# Patient Record
Sex: MALE | Race: WHITE | URBAN - METROPOLITAN AREA
[De-identification: names, ages, dates, MRNs, and addresses within clinical notes are randomized per-mention and may not be internally consistent; named-entity substitution may affect disease eponyms.]

---

## 2017-08-09 ENCOUNTER — HISTORICAL (OUTPATIENT)
Dept: LAB | Facility: HOSPITAL | Age: 82
End: 2017-08-09

## 2017-08-09 LAB
ABS NEUT (OLG): 2.36 X10(3)/MCL (ref 2.1–9.2)
ANISOCYTOSIS BLD QL SMEAR: 1
BASOPHILS NFR BLD MANUAL: 1 % (ref 0–2)
BUN SERPL-MCNC: 14 MG/DL (ref 7–18)
CALCIUM SERPL-MCNC: 8.3 MG/DL (ref 8.5–10.1)
CHLORIDE SERPL-SCNC: 106 MMOL/L (ref 98–107)
CO2 SERPL-SCNC: 29 MMOL/L (ref 21–32)
CREAT SERPL-MCNC: 0.76 MG/DL (ref 0.7–1.3)
EOSINOPHIL NFR BLD MANUAL: 2 % (ref 0–8)
ERYTHROCYTE [DISTWIDTH] IN BLOOD BY AUTOMATED COUNT: 13 % (ref 11.5–17)
GLUCOSE SERPL-MCNC: 201 MG/DL (ref 74–106)
HCT VFR BLD AUTO: 39 % (ref 42–52)
HGB BLD-MCNC: 13.1 GM/DL (ref 14–18)
LYMPHOCYTES NFR BLD MANUAL: 13 %
LYMPHOCYTES NFR BLD MANUAL: 6 % (ref 13–40)
MCH RBC QN AUTO: 30.2 PG (ref 27–31)
MCHC RBC AUTO-ENTMCNC: 33.6 GM/DL (ref 33–36)
MCV RBC AUTO: 89.9 FL (ref 80–94)
MONOCYTES NFR BLD MANUAL: 10 % (ref 2–11)
NEUTROPHILS NFR BLD MANUAL: 68 % (ref 47–80)
PLATELET # BLD AUTO: 133 X10(3)/MCL (ref 130–400)
PLATELET # BLD EST: NORMAL 10*3/UL
PMV BLD AUTO: 8.3 FL (ref 9.4–12.4)
POTASSIUM SERPL-SCNC: 4.5 MMOL/L (ref 3.5–5.1)
RBC # BLD AUTO: 4.34 X10(6)/MCL (ref 4.7–6.1)
SODIUM SERPL-SCNC: 144 MMOL/L (ref 136–145)
WBC # SPEC AUTO: 4 X10(3)/MCL (ref 4.5–11.5)

## 2020-12-08 LAB
ABS NEUT (OLG): 3.91
BASOPHILS # BLD AUTO: 0.01 X10(3)/MCL
BASOPHILS NFR BLD AUTO: 0.1 %
EOSINOPHIL # BLD AUTO: 0.18 X10(3)/MCL
EOSINOPHIL NFR BLD AUTO: 2.6 %
ERYTHROCYTE [DISTWIDTH] IN BLOOD BY AUTOMATED COUNT: 14 %
HCT VFR BLD AUTO: 27.3 % (ref 39–49)
HGB BLD-MCNC: 8.9 GM/DL (ref 12.6–16.6)
IMM GRANULOCYTES # BLD AUTO: 0.02 10*3/UL (ref 0–0.1)
IMM GRANULOCYTES NFR BLD AUTO: 0.3 % (ref 0–1)
LYMPHOCYTES # BLD AUTO: 1.71 X10(3)/MCL
LYMPHOCYTES NFR BLD AUTO: 25.1 %
MCH RBC QN AUTO: 30.2 PG (ref 27–33)
MCHC RBC AUTO-ENTMCNC: 32.6 GM/DL (ref 32–35)
MCV RBC AUTO: 92.5 FL (ref 84–97)
MONOCYTES # BLD AUTO: 0.97 X10(3)/MCL
MONOCYTES NFR BLD AUTO: 14.3 %
NEUTROPHILS # BLD AUTO: 3.91 X10(3)/MCL
NEUTROPHILS NFR BLD AUTO: 57.6 %
PLATELET # BLD AUTO: 214 X10(3)/MCL (ref 140–450)
PMV BLD AUTO: 8 FL
RBC # BLD AUTO: 2.95 X10(6)/MCL (ref 4.3–5.6)
WBC # SPEC AUTO: 6.8 X10(3)/MCL (ref 3.4–9.2)

## 2020-12-09 LAB
ABS NEUT (OLG): 4.03
ALBUMIN SERPL-MCNC: 2.4 GM/DL (ref 3.4–4.8)
ALBUMIN/GLOB SERPL: 0.8 RATIO (ref 1.1–2)
ALP SERPL-CCNC: 57 UNIT/L (ref 40–150)
ALT SERPL-CCNC: 15 UNIT/L (ref 0–55)
AST SERPL-CCNC: 21 UNIT/L (ref 5–34)
BASOPHILS # BLD AUTO: 0.01 X10(3)/MCL
BASOPHILS NFR BLD AUTO: 0.1 %
BILIRUB SERPL-MCNC: 1 MG/DL
BILIRUBIN DIRECT+TOT PNL SERPL-MCNC: 0.5 MG/DL
BILIRUBIN DIRECT+TOT PNL SERPL-MCNC: 0.5 MG/DL (ref 0–0.5)
BUN SERPL-MCNC: 17 MG/DL (ref 8.4–25.7)
CALCIUM SERPL-MCNC: 7.9 MG/DL (ref 8.8–10)
CHLORIDE SERPL-SCNC: 102 MMOL/L (ref 98–107)
CO2 SERPL-SCNC: 29 MEQ/L (ref 23–31)
CREAT SERPL-MCNC: 0.65 MG/DL (ref 0.73–1.18)
EOSINOPHIL # BLD AUTO: 0.19 X10(3)/MCL
EOSINOPHIL NFR BLD AUTO: 2.8 %
ERYTHROCYTE [DISTWIDTH] IN BLOOD BY AUTOMATED COUNT: 14 %
EST. AVERAGE GLUCOSE BLD GHB EST-MCNC: 166 MG/DL
GLOBULIN SER-MCNC: 2.9 GM/DL (ref 2.4–3.5)
GLUCOSE SERPL-MCNC: 136 MG/DL (ref 82–115)
HBA1C MFR BLD: 7.4 % (ref 4–6)
HCT VFR BLD AUTO: 27.4 % (ref 39–49)
HGB BLD-MCNC: 8.8 GM/DL (ref 12.6–16.6)
IMM GRANULOCYTES # BLD AUTO: 0.02 10*3/UL (ref 0–0.1)
IMM GRANULOCYTES NFR BLD AUTO: 0.3 % (ref 0–1)
LYMPHOCYTES # BLD AUTO: 1.68 X10(3)/MCL
LYMPHOCYTES NFR BLD AUTO: 24.6 %
MAGNESIUM SERPL-MCNC: 2.08 MG/DL (ref 1.6–2.6)
MCH RBC QN AUTO: 29.8 PG (ref 27–33)
MCHC RBC AUTO-ENTMCNC: 32.1 GM/DL (ref 32–35)
MCV RBC AUTO: 92.9 FL (ref 84–97)
MONOCYTES # BLD AUTO: 0.89 X10(3)/MCL
MONOCYTES NFR BLD AUTO: 13 %
NEUTROPHILS # BLD AUTO: 4.03 X10(3)/MCL
NEUTROPHILS NFR BLD AUTO: 59.2 %
PLATELET # BLD AUTO: 219 X10(3)/MCL (ref 140–450)
PMV BLD AUTO: 8 FL
POTASSIUM SERPL-SCNC: 4.4 MMOL/L (ref 3.5–5.1)
PROT SERPL-MCNC: 5.3 GM/DL (ref 5.8–7.6)
RBC # BLD AUTO: 2.95 X10(6)/MCL (ref 4.3–5.6)
SODIUM SERPL-SCNC: 137 MMOL/L (ref 136–145)
WBC # SPEC AUTO: 6.82 X10(3)/MCL (ref 3.4–9.2)

## 2020-12-14 LAB
ABS NEUT (OLG): 4.73
ALBUMIN SERPL-MCNC: 2.6 GM/DL (ref 3.4–4.8)
ALBUMIN/GLOB SERPL: 0.9 RATIO (ref 1.1–2)
ALP SERPL-CCNC: 78 UNIT/L (ref 40–150)
ALT SERPL-CCNC: 12 UNIT/L (ref 0–55)
AST SERPL-CCNC: 15 UNIT/L (ref 5–34)
BASOPHILS # BLD AUTO: 0.02 X10(3)/MCL
BASOPHILS NFR BLD AUTO: 0.3 %
BILIRUB SERPL-MCNC: 1 MG/DL
BILIRUBIN DIRECT+TOT PNL SERPL-MCNC: 0.5 MG/DL
BILIRUBIN DIRECT+TOT PNL SERPL-MCNC: 0.5 MG/DL (ref 0–0.5)
BUN SERPL-MCNC: 20 MG/DL (ref 8.4–25.7)
CALCIUM SERPL-MCNC: 8.1 MG/DL (ref 8.8–10)
CHLORIDE SERPL-SCNC: 99 MMOL/L (ref 98–107)
CO2 SERPL-SCNC: 27 MEQ/L (ref 23–31)
CREAT SERPL-MCNC: 0.79 MG/DL (ref 0.73–1.18)
EOSINOPHIL # BLD AUTO: 0.09 X10(3)/MCL
EOSINOPHIL NFR BLD AUTO: 1.3 %
ERYTHROCYTE [DISTWIDTH] IN BLOOD BY AUTOMATED COUNT: 14 %
GLOBULIN SER-MCNC: 2.9 GM/DL (ref 2.4–3.5)
GLUCOSE SERPL-MCNC: 115 MG/DL (ref 82–115)
HCT VFR BLD AUTO: 29 % (ref 39–49)
HGB BLD-MCNC: 9.2 GM/DL (ref 12.6–16.6)
IMM GRANULOCYTES # BLD AUTO: 0.03 10*3/UL (ref 0–0.1)
IMM GRANULOCYTES NFR BLD AUTO: 0.4 % (ref 0–1)
LYMPHOCYTES # BLD AUTO: 1.34 X10(3)/MCL
LYMPHOCYTES NFR BLD AUTO: 19.2 %
MCH RBC QN AUTO: 29.8 PG (ref 27–33)
MCHC RBC AUTO-ENTMCNC: 31.7 GM/DL (ref 32–35)
MCV RBC AUTO: 93.9 FL (ref 84–97)
MONOCYTES # BLD AUTO: 0.77 X10(3)/MCL
MONOCYTES NFR BLD AUTO: 11 %
NEUTROPHILS # BLD AUTO: 4.73 X10(3)/MCL
NEUTROPHILS NFR BLD AUTO: 67.8 %
PLATELET # BLD AUTO: 324 X10(3)/MCL (ref 140–450)
PMV BLD AUTO: 8 FL
POTASSIUM SERPL-SCNC: 4.7 MMOL/L (ref 3.5–5.1)
PROT SERPL-MCNC: 5.5 GM/DL (ref 5.8–7.6)
RBC # BLD AUTO: 3.09 X10(6)/MCL (ref 4.3–5.6)
SODIUM SERPL-SCNC: 134 MMOL/L (ref 136–145)
WBC # SPEC AUTO: 6.98 X10(3)/MCL (ref 3.4–9.2)

## 2020-12-19 LAB
ABS NEUT (OLG): 3.26
ALBUMIN SERPL-MCNC: 2.9 GM/DL (ref 3.4–4.8)
ALBUMIN/GLOB SERPL: 1 RATIO (ref 1.1–2)
ALP SERPL-CCNC: 141 UNIT/L (ref 40–150)
ALT SERPL-CCNC: 10 UNIT/L (ref 0–55)
AST SERPL-CCNC: 12 UNIT/L (ref 5–34)
BASOPHILS # BLD AUTO: 0.01 X10(3)/MCL
BASOPHILS NFR BLD AUTO: 0.2 %
BILIRUB SERPL-MCNC: 0.8 MG/DL
BILIRUBIN DIRECT+TOT PNL SERPL-MCNC: 0.4 MG/DL
BILIRUBIN DIRECT+TOT PNL SERPL-MCNC: 0.4 MG/DL (ref 0–0.5)
BUN SERPL-MCNC: 21 MG/DL (ref 8.4–25.7)
CALCIUM SERPL-MCNC: 8.4 MG/DL (ref 8.8–10)
CHLORIDE SERPL-SCNC: 103 MMOL/L (ref 98–107)
CO2 SERPL-SCNC: 29 MEQ/L (ref 23–31)
CREAT SERPL-MCNC: 0.71 MG/DL (ref 0.73–1.18)
EOSINOPHIL # BLD AUTO: 0.07 X10(3)/MCL
EOSINOPHIL NFR BLD AUTO: 1.2 %
ERYTHROCYTE [DISTWIDTH] IN BLOOD BY AUTOMATED COUNT: 14 %
GLOBULIN SER-MCNC: 3 GM/DL (ref 2.4–3.5)
GLUCOSE SERPL-MCNC: 135 MG/DL (ref 82–115)
HCT VFR BLD AUTO: 30.7 % (ref 39–49)
HGB BLD-MCNC: 9.6 GM/DL (ref 12.6–16.6)
IMM GRANULOCYTES # BLD AUTO: 0.01 10*3/UL (ref 0–0.1)
IMM GRANULOCYTES NFR BLD AUTO: 0.2 % (ref 0–1)
LYMPHOCYTES # BLD AUTO: 1.66 X10(3)/MCL
LYMPHOCYTES NFR BLD AUTO: 29.3 %
MAGNESIUM SERPL-MCNC: 2.1 MG/DL (ref 1.6–2.6)
MCH RBC QN AUTO: 29.7 PG (ref 27–33)
MCHC RBC AUTO-ENTMCNC: 31.3 GM/DL (ref 32–35)
MCV RBC AUTO: 95 FL (ref 84–97)
MONOCYTES # BLD AUTO: 0.66 X10(3)/MCL
MONOCYTES NFR BLD AUTO: 11.6 %
NEUTROPHILS # BLD AUTO: 3.26 X10(3)/MCL
NEUTROPHILS NFR BLD AUTO: 57.5 %
PLATELET # BLD AUTO: 382 X10(3)/MCL (ref 140–450)
PMV BLD AUTO: 8 FL
POTASSIUM SERPL-SCNC: 5.1 MMOL/L (ref 3.5–5.1)
PROT SERPL-MCNC: 5.9 GM/DL (ref 5.8–7.6)
RBC # BLD AUTO: 3.23 X10(6)/MCL (ref 4.3–5.6)
SODIUM SERPL-SCNC: 139 MMOL/L (ref 136–145)
WBC # SPEC AUTO: 5.67 X10(3)/MCL (ref 3.4–9.2)

## 2020-12-23 ENCOUNTER — HISTORICAL (OUTPATIENT)
Dept: ADMINISTRATIVE | Facility: HOSPITAL | Age: 85
End: 2020-12-23

## 2020-12-26 LAB
ABS NEUT (OLG): 4.26
ALBUMIN SERPL-MCNC: 3.4 GM/DL (ref 3.4–4.8)
ALBUMIN/GLOB SERPL: 1.1 RATIO (ref 1.1–2)
ALP SERPL-CCNC: 217 UNIT/L (ref 40–150)
ALT SERPL-CCNC: 17 UNIT/L (ref 0–55)
AST SERPL-CCNC: 17 UNIT/L (ref 5–34)
BASOPHILS # BLD AUTO: 0.01 X10(3)/MCL
BASOPHILS NFR BLD AUTO: 0.2 %
BILIRUB SERPL-MCNC: 0.7 MG/DL
BILIRUBIN DIRECT+TOT PNL SERPL-MCNC: 0.3 MG/DL
BILIRUBIN DIRECT+TOT PNL SERPL-MCNC: 0.4 MG/DL (ref 0–0.5)
BUN SERPL-MCNC: 24 MG/DL (ref 8.4–25.7)
CALCIUM SERPL-MCNC: 8.8 MG/DL (ref 8.8–10)
CHLORIDE SERPL-SCNC: 95 MMOL/L (ref 98–107)
CO2 SERPL-SCNC: 28 MEQ/L (ref 23–31)
CREAT SERPL-MCNC: 0.82 MG/DL (ref 0.73–1.18)
EOSINOPHIL # BLD AUTO: 0.06 X10(3)/MCL
EOSINOPHIL NFR BLD AUTO: 0.9 %
ERYTHROCYTE [DISTWIDTH] IN BLOOD BY AUTOMATED COUNT: 14 %
GLOBULIN SER-MCNC: 3.1 GM/DL (ref 2.4–3.5)
GLUCOSE SERPL-MCNC: 258 MG/DL (ref 82–115)
HCT VFR BLD AUTO: 37.5 % (ref 39–49)
HGB BLD-MCNC: 11.9 GM/DL (ref 12.6–16.6)
IMM GRANULOCYTES # BLD AUTO: 0.01 10*3/UL (ref 0–0.1)
IMM GRANULOCYTES NFR BLD AUTO: 0.2 % (ref 0–1)
LYMPHOCYTES # BLD AUTO: 1.35 X10(3)/MCL
LYMPHOCYTES NFR BLD AUTO: 21.3 %
MCH RBC QN AUTO: 29.7 PG (ref 27–33)
MCHC RBC AUTO-ENTMCNC: 31.7 GM/DL (ref 32–35)
MCV RBC AUTO: 93.5 FL (ref 84–97)
MONOCYTES # BLD AUTO: 0.64 X10(3)/MCL
MONOCYTES NFR BLD AUTO: 10.1 %
NEUTROPHILS # BLD AUTO: 4.26 X10(3)/MCL
NEUTROPHILS NFR BLD AUTO: 67.3 %
PLATELET # BLD AUTO: 282 X10(3)/MCL (ref 140–450)
PMV BLD AUTO: 8 FL
POTASSIUM SERPL-SCNC: 4.3 MMOL/L (ref 3.5–5.1)
PROT SERPL-MCNC: 6.5 GM/DL (ref 5.8–7.6)
RBC # BLD AUTO: 4.01 X10(6)/MCL (ref 4.3–5.6)
SODIUM SERPL-SCNC: 134 MMOL/L (ref 136–145)
WBC # SPEC AUTO: 6.33 X10(3)/MCL (ref 3.4–9.2)

## 2020-12-27 LAB
ABS NEUT (OLG): 3.76
BASOPHILS # BLD AUTO: 0.01 X10(3)/MCL
BASOPHILS NFR BLD AUTO: 0.2 %
BUN SERPL-MCNC: 21 MG/DL (ref 8.4–25.7)
CALCIUM SERPL-MCNC: 8.6 MG/DL (ref 8.8–10)
CHLORIDE SERPL-SCNC: 99 MMOL/L (ref 98–107)
CO2 SERPL-SCNC: 30 MEQ/L (ref 23–31)
CREAT SERPL-MCNC: 0.77 MG/DL (ref 0.73–1.18)
CREAT/UREA NIT SERPL: 27
EOSINOPHIL # BLD AUTO: 0.12 X10(3)/MCL
EOSINOPHIL NFR BLD AUTO: 1.8 %
ERYTHROCYTE [DISTWIDTH] IN BLOOD BY AUTOMATED COUNT: 14 %
GLUCOSE SERPL-MCNC: 221 MG/DL (ref 82–115)
HCT VFR BLD AUTO: 32.7 % (ref 39–49)
HGB BLD-MCNC: 10.5 GM/DL (ref 12.6–16.6)
IMM GRANULOCYTES # BLD AUTO: 0.01 10*3/UL (ref 0–0.1)
IMM GRANULOCYTES NFR BLD AUTO: 0.2 % (ref 0–1)
LYMPHOCYTES # BLD AUTO: 1.92 X10(3)/MCL
LYMPHOCYTES NFR BLD AUTO: 29.6 %
MCH RBC QN AUTO: 30.3 PG (ref 27–33)
MCHC RBC AUTO-ENTMCNC: 32.1 GM/DL (ref 32–35)
MCV RBC AUTO: 94.2 FL (ref 84–97)
MONOCYTES # BLD AUTO: 0.67 X10(3)/MCL
MONOCYTES NFR BLD AUTO: 10.3 %
NEUTROPHILS # BLD AUTO: 3.76 X10(3)/MCL
NEUTROPHILS NFR BLD AUTO: 57.9 %
PLATELET # BLD AUTO: 237 X10(3)/MCL (ref 140–450)
PMV BLD AUTO: 8 FL
POTASSIUM SERPL-SCNC: 4.6 MMOL/L (ref 3.5–5.1)
RBC # BLD AUTO: 3.47 X10(6)/MCL (ref 4.3–5.6)
SODIUM SERPL-SCNC: 136 MMOL/L (ref 136–145)
WBC # SPEC AUTO: 6.49 X10(3)/MCL (ref 3.4–9.2)

## 2022-05-05 NOTE — HISTORICAL [facility]
This is a historical note converted from John. Formatting and pictures may have been removed.  Please reference John for original formatting and attached multimedia. Admit and Discharge Dates  Admit Date: 12/08/2020  Discharge Date: 12/28/2020  Physicians  Attending Physician - Tracy CHERRY, Carrillo NUNEZ  Admitting Physician - Tracy CHERRY, Carrillo NUNEZ  Primary Care Physician - Reuben CHERRY, Rocael ?A  Discharge Diagnosis  1.?Physical deconditioning?R53.81  2.?Intertrochanteric fracture of left femur?S72.142A  3.?A-fib?I48.91  4.?HTN (hypertension)?I10  5.?Sleep apnea?G47.30  6.?DM (diabetes mellitus)?E11.9  Surgical Procedures  No procedures recorded for this visit.  Immunizations  12/16/2020 - influenza virus vaccine, inactivated?  Admission Information  86yo male admitted to swing bed for therapy s/p a left intertrochanteric femur fracture.? He underwent IM nail placement.? he is doing well at this time.? It was felt that he needed additional therapy therefore he will be admitted here.? Currently no N/V/D/C.? No fever/chills.? No chest pain/SOB.? Besides some mild pain at this time he has no other complaints.? He also has some venous stasis ulcers on his lower leg s  Significant Findings  during the course of hospital stay he continued to work with PT and has been doing better. pt has been accepted to NH, covid test was ordered which came back negative. discharged pt to nursing home in a stable condition.  Time Spent on discharge  35 min  Objective  Vitals & Measurements  T:?37.0? ?C (Oral)? TMIN:?36.8? ?C (Oral)? TMAX:?37.0? ?C (Oral)? HR:?68(Apical)? RR:?18? BP:?111/62? SpO2:?94%?  Physical Exam  ??General: AAOx3, NAD, alert and cooperative  HEENT:? EOMI, normal conjunctiva  CVS: S1/S2 nml, RRR, no murmurs, rubs or gallops  Resp: CTA B/L, no rhonchi, rales, or wheezing  GI: Not distended, not tender, BS+, no guarding  Extremities: no peripheral edema, dec rom of left hip  Neuro- no focal neurological  deficits  Patient Discharge Condition  stable  Discharge Disposition  nursing home   Discharge Medication Reconciliation  Discharge Med Rec is not complete  Car Seat Challenge  No Qualifying Data

## 2022-05-05 NOTE — HISTORICAL [facility]
This is a historical note converted from John. Formatting and pictures may have been removed.  Please reference John for original formatting and attached multimedia. Chief Complaint  deconditioning after hip fracture  History of Present Illness  88yo male admitted to swing bed for therapy s/p a left intertrochanteric femur fracture.? He underwent IM nail placement.? he is doing well at this time.? It was felt that he needed additional therapy therefore he will be admitted here.? Currently no N/V/D/C.? No fever/chills.? No chest pain/SOB.? Besides some mild pain at this time he has no other complaints.? He also has some venous stasis ulcers on his lower legs.  Review of Systems  ?  ?????Constitutional: ?No fever, No chills, No sweats,?+ weakness, No fatigue. ?  ?????Eye: ?No double vision, No dry eyes, No photophobia. ?  ?????Ear/Nose/Mouth/Throat: ?No ear pain, No nasal congestion, No sore throat. ?  ?????Respiratory: ?No shortness of breath, No cough, No sputum production, No hemoptysis, No wheezing, No pleuritic pain. ?  ?????Cardiovascular: ?No chest pain, No palpitations, No peripheral edema, No syncope. ?  ?????Gastrointestinal: ?No nausea, No vomiting, No diarrhea, No constipation, No heartburn, No abdominal pain. ?  ?????Genitourinary: ?No dysuria, No hematuria, No change in urine stream. ?  ?????Hematology/Lymphatics: ?No anemia, No bruising tendency, No bruise, No hemorrhage, No petechiae. ?  ?????Endocrine: ?No excessive thirst, No polyuria, No cold intolerance. ?  ?????Immunologic: ?No recurrent fevers, No recurrent infections. ?  ?????Musculoskeletal: ?+Joint pain, No back pain, No muscle pain, No decreased range of motion. ?  ?????Integumentary: ?No rash, No pruritus, No petechiae,?+ skin lesion. ?  ?????Neurologic: ?Alert and oriented X4, No abnormal balance, No confusion, No tingling. ?  ?????Psychiatric: ?No anxiety, No depression. ?  Physical Exam  Vitals & Measurements  T:?37.5? ?C (Oral)?  TMIN:?36.9? ?C (Oral)? TMAX:?37.5? ?C (Oral)? HR:?67(Peripheral)? RR:?20? BP:?148/81? SpO2:?94%? WT:?79.9?kg? BMI:?23.24?  General: ?Alert and oriented, No acute distress. ?  ??????? ? Appearance: frail. ?  ??????? ? Behavior: Appropriate. ?  ??????? ? Skin: Normal for ethnicity. ?  ?????Eye: ?Pupils are equal, round and reactive to light, Extraocular movements are intact. ?  ?????HENT: ?Normocephalic, Normal hearing, Oral mucosa is moist, No pharyngeal erythema. ?  ?????Neck: ?Supple, Non-tender, No carotid bruit, No jugular venous distention, No lymphadenopathy, No thyromegaly. ?  ?????Respiratory: ?Lungs are clear to auscultation, Breath sounds are equal, No chest wall tenderness. ?  ?????Cardiovascular: ?Normal rate, Regular rhythm,+murmur, No gallop, Good pulses equal in all extremities, Normal peripheral perfusion, No edema. ?  ?????Gastrointestinal: ?Soft, Non-tender, Non-distended, Normal bowel sounds, No organomegaly. ?  ?????Genitourinary: ?No costovertebral angle tenderness, No urethral discharge. ?  ?????Lymphatics: ?No lymphadenopathy neck, axilla, groin. ?  ?????Musculoskeletal: ?Normal range of motion, Normal strength,?+ tenderness, No swelling, Normal gait. ?  ?????Integumentary: ?Warm, Dry, Pink, Intact, No rash. ?ulcers to lower legs  ?????Neurologic: ?Alert, Oriented, Normal sensory, Normal motor function, No focal deficits, Cranial Nerves II-XII are grossly intact. ?  ?????Psychiatric: ?Cooperative, Appropriate mood & affect, Normal judgment. ?  Assessment/Plan  1.?Physical deconditioning?R53.81  2.?Intertrochanteric fracture of left femur?S72.142A  3.?A-fib?I48.91  4.?HTN (hypertension)?I10  5.?Sleep apnea?G47.30  6.?DM (diabetes mellitus)?E11.9  Orders:  amLODIPine, 10 mg, form: Tab, Oral, At Bedtime, first dose 12/08/20 21:00:00 CST  cloNIDine, 0.1 mg, form: Tab, Oral, TID, first dose 12/08/20 21:00:00 CST  clopidogrel, 75 mg, form: Tab, Oral, Daily, first dose 12/09/20 9:00:00 CST  digoxin,  250 mcg, form: Tab, Oral, Daily, first dose 12/09/20 9:00:00 CST  docusate, 100 mg, form: Cap, Oral, BID PRN for constipation, first dose 12/08/20 16:11:00 CST  enoxaparin, 40 mg, form: Injection, Subcutaneous, Daily, first dose 12/09/20 9:00:00 CST  finasteride, 5 mg, form: Tab, Oral, Daily, first dose 12/09/20 9:00:00 CST  glipiZIDE, 20 mg, form: Tab, Oral, BID, first dose 12/08/20 21:00:00 CST  hydrALAZINE, 100 mg, form: Tab, Oral, TID, first dose 12/08/20 21:00:00 CST  Influenza Virus Vaccine, Inactivated, 0.7 mL, form: Susp, IM, Once-Unscheduled, Order duration: 1 dose(s), first dose 12/08/20 14:53:00 CST  metoprolol, 100 mg, form: Tab XL, Oral, Daily, first dose 12/09/20 9:00:00 CST  multivitamin, 1 tab(s), form: Tab, Oral, Daily, first dose 12/08/20 16:12:00 CST  potassium chloride, 20 mEq, form: Tab-ER, Oral, Daily, first dose 12/09/20 9:00:00 CST  pravastatin, 40 mg, form: Tab, Oral, At Bedtime, first dose 12/08/20 21:00:00 CST  tamsulosin, 0.4 mg, form: Cap, Oral, Daily, first dose 12/09/20 9:00:00 CST  Template Non-Formulary Med, 40mg-25mg, form: Tab, Oral, Daily, first dose 12/09/20 9:00:00 CST  Template Non-Formulary Med, 50 mg, form: Cap, Oral, TID, first dose 12/08/20 21:00:00 CST  traMADol, 50 mg, form: Tab, Oral, q6hr PRN for pain, first dose 12/08/20 14:05:00 CST  Admit to Swing Bed, 12/08/20 11:27:00 CST, Medical Unit Tracy CHERRY, Jovanna Zuleta  CBC w/ Auto Diff, AM Next collect, 12/09/20 5:00:00 CST, Blood, Stop date 12/09/20 5:00:00 CST, Lab Collect, 12/09/20 5:00:00 CST  Comprehensive Metabolic Panel, AM Next collect, 12/09/20 5:00:00 CST, Blood, Stop date 12/09/20 5:00:00 CST, Lab Collect, in AM, 12/09/20 5:00:00 CST  Diabetic Meal Plan, 12/08/20 14:18:00 CST, Start Meal: Next Meal  Magnesium Level, AM Next collect, 12/09/20 5:00:00 CST, Blood, Stop date 12/09/20 5:00:00 CST, Lab Collect, 12/09/20 5:00:00 CST  Occupational Therapy Eval and Treat, 12/08/20 14:38:00 CST, Mobility, Stop date  12/08/20 14:38:00 CST, Patient Bed, Patient has IV, Standard Precautions  Physical Therapy Eval and Treat, 12/08/20 14:38:00 CST, Mobility, Patient has IV, Standard Precautions  Speech Language Pathology Eval and Treat, 12/08/20 14:39:00 CST, Speech/Language/Cognition, Evaluation and Treatment, Patient Bed, Patient has IV, Standard Precautions  Admit to swing bed on 12/8/20 at 13:05  resume transfer meds  DVT prophylaxis  follow labs  wound care  therapy  ISS  OOB   Problem List/Past Medical History  Ongoing  A-fib  AAA (abdominal aortic aneurysm)  Aneurysm artery, iliac  Diverticulitis of colon  DM (diabetes mellitus)  DVT (deep venous thrombosis)  Heart disease  HTN (hypertension)  lower Back pain  MI (myocardial infarction)  Renal disease  Sleep apnea  Varicose vein  Historical  No qualifying data  Procedure/Surgical History  Endovascular Repair AAA (Right) (08/10/2017)  Restriction of Right Common Iliac Artery with Intraluminal Device, Percutaneous Approach (08/10/2017)  Cataract surgery  Laparoscopic repair of hernia  Varicose vein ligation and stripping   Medications  Inpatient  amlodipine 5 mg oral tablet, 10 mg= 2 tab(s), Oral, At Bedtime  Benicar HCT 40 mg-25 mg oral tablet, 1 tab(s), Oral, Daily  cloniDINE 0.1 mg oral tablet, 0.1 mg= 1 tab(s), Oral, TID  digoxin 250 mcg (0.25 mg) oral tablet, 250 mcg= 2 tab(s), Oral, Daily  docusate sodium 100 mg oral capsule, 100 mg= 1 cap(s), Oral, BID, PRN  finasteride 5 mg oral tablet, 5 mg= 1 tab(s), Oral, Daily  Fluzone High Dose 2020-21 intramuscular suspension, 0.7 mL, IM, Once-Unscheduled  glipiZIDE 5 mg oral tablet, 20 mg= 4 tab(s), Oral, BID  hydrALAZINE 25 mg oral tablet, 100 mg= 4 tab(s), Oral, TID  Lovenox, 40 mg= 0.4 mL, Subcutaneous, Daily  metoprolol succinate 50 mg oral tablet extended release, 100 mg= 2 tab(s), Oral, Daily  Multi Vitamin+, 1 tab(s), Oral, Daily  Plavix 75 mg oral tablet, 75 mg= 1 tab(s), Oral, Daily  potassium chloride 10 mEq oral TABLET  extended release, 20 mEq= 2 tab(s), Oral, Daily  pravastatin, 40 mg= 2 tab(s), Oral, At Bedtime  tamsulosin 0.4 mg oral capsule, 0.4 mg= 1 cap(s), Oral, Daily  traMADol, 50 mg= 1 tab(s), Oral, q6hr, PRN  zinc (as acetate) 50 mg oral capsule, 50 mg, Oral, TID  Home  amlodipine 10 mg oral tablet, 10 mg= 1 tab(s), Oral, At Bedtime  aspirin 81 mg oral Delayed Release (EC) tablet, 81 mg= 1 tab(s), Oral, Daily,? ?Not taking  Benicar HCT 40 mg-25 mg oral tablet, 1 tab(s), Oral, Daily  cloniDINE 0.1 mg oral tablet, 0.1 mg= 1 tab(s), Oral, TID  digoxin 250 mcg (0.25 mg) oral tablet, 250 mcg= 1 tab(s), Oral, Daily  docusate sodium 100 mg oral capsule, 100 mg= 1 cap(s), Oral, BID, PRN  finasteride 5 mg oral tablet, 5 mg= 1 tab(s), Oral, Daily  glipiZIDE 10 mg oral tablet, 20 mg= 2 tab(s), Oral, BID  glipiZIDE 10 mg oral tablet, 15 mg= 1.5 tab(s), Oral, Daily,? ?Not taking  hydrALAZINE 25 mg oral tablet, 100 mg= 4 tab(s), Oral, TID  metoprolol succinate 100 mg oral tablet extended release, 100 mg= 1 tab(s), Oral, Daily  Multi Vitamin+, 1 tab(s), Oral, Daily  Plavix 75 mg oral tablet, 75 mg= 1 tab(s), Oral, Daily  potassium chloride 10 mEq oral CAPSULE extended release, 20 mEq= 2 cap(s), Oral, Daily  Pravastatin 40 mg Oral Tab, 40 mg= 1 tab(s), Oral, At Bedtime  tamsulosin 0.4 mg oral capsule, 0.4 mg= 1 cap(s), Oral, Daily  valsartan 320 mg oral tablet, 320 mg= 1 tab(s), Oral, Daily,? ?Not taking  Vitamin B1, 1 tablet, Oral, Daily,? ?Not taking  zinc (as acetate) 50 mg oral capsule, 50 mg= 1 cap(s), Oral, TID  Allergies  sulfamethoxazole?(Rash)  Social History  Abuse/Neglect  No, 12/08/2020  Alcohol  Never, 08/09/2017  Tobacco  Never (less than 100 in lifetime), N/A, 12/08/2020  Never smoker, 08/09/2017  Family History  CAD, DM  Immunizations  Vaccine Date Status   tetanus/diphtheria/pertussis, acel(Tdap) 05/18/2018 Given       non-displaced fracture

## 2022-08-18 ENCOUNTER — HISTORICAL (OUTPATIENT)
Dept: ADMINISTRATIVE | Facility: HOSPITAL | Age: 87
End: 2022-08-18

## 2022-08-20 ENCOUNTER — HISTORICAL (OUTPATIENT)
Dept: ADMINISTRATIVE | Facility: HOSPITAL | Age: 87
End: 2022-08-20

## 2022-08-24 ENCOUNTER — HISTORICAL (OUTPATIENT)
Dept: ADMINISTRATIVE | Facility: HOSPITAL | Age: 87
End: 2022-08-24